# Patient Record
Sex: MALE | Race: OTHER | URBAN - METROPOLITAN AREA
[De-identification: names, ages, dates, MRNs, and addresses within clinical notes are randomized per-mention and may not be internally consistent; named-entity substitution may affect disease eponyms.]

---

## 2018-03-09 ENCOUNTER — TELEPHONE (OUTPATIENT)
Dept: FAMILY MEDICINE CLINIC | Age: 43
End: 2018-03-09

## 2018-03-19 ENCOUNTER — OFFICE VISIT (OUTPATIENT)
Dept: FAMILY MEDICINE CLINIC | Age: 43
End: 2018-03-19

## 2018-03-19 VITALS
HEART RATE: 96 BPM | OXYGEN SATURATION: 98 % | WEIGHT: 192 LBS | HEIGHT: 71 IN | DIASTOLIC BLOOD PRESSURE: 82 MMHG | BODY MASS INDEX: 26.88 KG/M2 | SYSTOLIC BLOOD PRESSURE: 120 MMHG

## 2018-03-19 DIAGNOSIS — Z00.00 WELL ADULT EXAM: Primary | ICD-10-CM

## 2018-03-19 PROCEDURE — 99386 PREV VISIT NEW AGE 40-64: CPT | Performed by: FAMILY MEDICINE

## 2018-03-19 ASSESSMENT — ENCOUNTER SYMPTOMS: RESPIRATORY NEGATIVE: 1

## 2018-03-19 NOTE — PROGRESS NOTES
breath sounds normal. He has no wheezes. He has no rales. Abdominal: Soft. Bowel sounds are normal. He exhibits no distension. There is no splenomegaly or hepatomegaly. There is no tenderness. Musculoskeletal: He exhibits no edema. Neurological: He is alert and oriented to person, place, and time. He has normal reflexes. No cranial nerve deficit. Skin: Skin is warm and dry. He is not diaphoretic. Psychiatric: He has a normal mood and affect. His behavior is normal. Judgment and thought content normal.     Assessment:      1. Well adult exam  Comprehensive Metabolic Panel    Lipid Panel    HIV Screen          Plan:       Will get tdap if no recent found  Fasting bloodwork ordered  Reviewed diet and exercise

## 2018-03-20 DIAGNOSIS — Z00.00 WELL ADULT EXAM: ICD-10-CM

## 2018-03-20 LAB
A/G RATIO: 1.6 (ref 1.1–2.2)
ALBUMIN SERPL-MCNC: 4.6 G/DL (ref 3.4–5)
ALP BLD-CCNC: 71 U/L (ref 40–129)
ALT SERPL-CCNC: 37 U/L (ref 10–40)
ANION GAP SERPL CALCULATED.3IONS-SCNC: 12 MMOL/L (ref 3–16)
AST SERPL-CCNC: 29 U/L (ref 15–37)
BILIRUB SERPL-MCNC: 0.3 MG/DL (ref 0–1)
BUN BLDV-MCNC: 16 MG/DL (ref 7–20)
CALCIUM SERPL-MCNC: 9.4 MG/DL (ref 8.3–10.6)
CHLORIDE BLD-SCNC: 102 MMOL/L (ref 99–110)
CHOLESTEROL, TOTAL: 211 MG/DL (ref 0–199)
CO2: 28 MMOL/L (ref 21–32)
CREAT SERPL-MCNC: 0.9 MG/DL (ref 0.9–1.3)
GFR AFRICAN AMERICAN: >60
GFR NON-AFRICAN AMERICAN: >60
GLOBULIN: 2.9 G/DL
GLUCOSE BLD-MCNC: 122 MG/DL (ref 70–99)
HDLC SERPL-MCNC: 58 MG/DL (ref 40–60)
LDL CHOLESTEROL CALCULATED: 118 MG/DL
POTASSIUM SERPL-SCNC: 4.8 MMOL/L (ref 3.5–5.1)
SODIUM BLD-SCNC: 142 MMOL/L (ref 136–145)
TOTAL PROTEIN: 7.5 G/DL (ref 6.4–8.2)
TRIGL SERPL-MCNC: 175 MG/DL (ref 0–150)
VLDLC SERPL CALC-MCNC: 35 MG/DL

## 2018-03-21 LAB
HIV AG/AB: NORMAL
HIV ANTIGEN: NORMAL
HIV-1 ANTIBODY: NORMAL
HIV-2 AB: NORMAL

## 2018-06-15 ENCOUNTER — TELEPHONE (OUTPATIENT)
Dept: FAMILY MEDICINE CLINIC | Age: 43
End: 2018-06-15

## 2018-12-03 ENCOUNTER — OFFICE VISIT (OUTPATIENT)
Dept: FAMILY MEDICINE CLINIC | Age: 43
End: 2018-12-03
Payer: COMMERCIAL

## 2018-12-03 VITALS
DIASTOLIC BLOOD PRESSURE: 82 MMHG | WEIGHT: 183 LBS | BODY MASS INDEX: 25.62 KG/M2 | HEART RATE: 93 BPM | HEIGHT: 71 IN | SYSTOLIC BLOOD PRESSURE: 138 MMHG | OXYGEN SATURATION: 97 %

## 2018-12-03 DIAGNOSIS — M54.50 ACUTE LOW BACK PAIN WITHOUT SCIATICA, UNSPECIFIED BACK PAIN LATERALITY: ICD-10-CM

## 2018-12-03 DIAGNOSIS — R53.83 FATIGUE, UNSPECIFIED TYPE: ICD-10-CM

## 2018-12-03 DIAGNOSIS — E78.2 ELEVATED TRIGLYCERIDES WITH HIGH CHOLESTEROL: ICD-10-CM

## 2018-12-03 DIAGNOSIS — R73.03 PREDIABETES: Primary | ICD-10-CM

## 2018-12-03 PROCEDURE — 99213 OFFICE O/P EST LOW 20 MIN: CPT | Performed by: FAMILY MEDICINE

## 2018-12-03 RX ORDER — METHYLPREDNISOLONE 4 MG/1
1 TABLET ORAL DAILY
COMMUNITY
Start: 2018-11-30 | End: 2021-02-25

## 2018-12-03 RX ORDER — CYCLOBENZAPRINE HCL 10 MG
10 TABLET ORAL DAILY
COMMUNITY
Start: 2018-11-30 | End: 2021-02-25 | Stop reason: ALTCHOICE

## 2018-12-03 NOTE — PROGRESS NOTES
Subjective:      Patient ID: Cristiana Mims is a 37 y.o. male. HPI   Pt is a of 37 y.o. male comes in today with   Chief Complaint   Patient presents with    Lower Back Pain     Patient complains of lower back pain since Friday, went to [facility] and prescribed     Results     3/20/18     Last Sunday bent forward and pulled back. Friday went to [facility]. Put on flexeril and steroid. Feeling a little better but still some stiffness. In March blood sugar and triglycerides were a little high. blood sugar high in march. Cut carbs in the evening. Has lost a few pounds. More regular exercise    Father has anemia and low thyroid. Notes fatigue at times. Past Medical History:Reviewed  Medications:Reviewed. No Known Allergies   Social hx:Reviewed. History   Smoking Status    Former Smoker    Types: Cigars   Smokeless Tobacco    Never Used     Review of Systems   Constitutional: Negative. Cardiovascular: Negative. Genitourinary: Negative. Vitals:    12/03/18 1404   BP: 138/82   Site: Right Upper Arm   Position: Sitting   Cuff Size: Medium Adult   Pulse: 93   SpO2: 97%   Weight: 183 lb (83 kg)   Height: 5' 11.25\" (1.81 m)      Objective:   Physical Exam    Assessment:       Diagnosis Orders   1. Prediabetes  Hemoglobin A1C   2. Elevated triglycerides with high cholesterol  Comprehensive Metabolic Panel    Lipid Panel   3. Fatigue, unspecified type  Comprehensive Metabolic Panel    CBC    TSH with Reflex    VITAMIN B12 & FOLATE   4. Acute low back pain without sciatica, unspecified back pain laterality            Plan:      Simeon Sahu was seen today for lower back pain and results. Diagnoses and all orders for this visit:    Prediabetes  -     Hemoglobin A1C; Future  Recheck to see if stable with diet and exercise  Elevated triglycerides with high cholesterol  -     Comprehensive Metabolic Panel; Future  -     Lipid Panel;  Future  Trying to control with therapeutic lifestyle changes   Fatigue, unspecified type  -     Comprehensive Metabolic Panel; Future  -     CBC; Future  -     TSH with Reflex; Future  -     VITAMIN B12 & FOLATE; Future  bloodwork to evaluate  Acute low back pain without sciatica, unspecified back pain laterality  Improving.  Home physical therapy exercises given and reviewed           Susan Parham MD

## 2021-02-25 ENCOUNTER — OFFICE VISIT (OUTPATIENT)
Dept: FAMILY MEDICINE CLINIC | Age: 46
End: 2021-02-25
Payer: COMMERCIAL

## 2021-02-25 VITALS
HEIGHT: 71 IN | DIASTOLIC BLOOD PRESSURE: 72 MMHG | SYSTOLIC BLOOD PRESSURE: 136 MMHG | WEIGHT: 180 LBS | OXYGEN SATURATION: 98 % | BODY MASS INDEX: 25.2 KG/M2 | HEART RATE: 93 BPM

## 2021-02-25 DIAGNOSIS — Z12.11 COLON CANCER SCREENING: ICD-10-CM

## 2021-02-25 DIAGNOSIS — R53.83 FATIGUE, UNSPECIFIED TYPE: ICD-10-CM

## 2021-02-25 DIAGNOSIS — Z00.00 WELL ADULT EXAM: Primary | ICD-10-CM

## 2021-02-25 DIAGNOSIS — Z00.00 WELL ADULT EXAM: ICD-10-CM

## 2021-02-25 LAB
A/G RATIO: 1.6 (ref 1.1–2.2)
ALBUMIN SERPL-MCNC: 4.6 G/DL (ref 3.4–5)
ALP BLD-CCNC: 70 U/L (ref 40–129)
ALT SERPL-CCNC: 25 U/L (ref 10–40)
ANION GAP SERPL CALCULATED.3IONS-SCNC: 13 MMOL/L (ref 3–16)
AST SERPL-CCNC: 21 U/L (ref 15–37)
BILIRUB SERPL-MCNC: 0.5 MG/DL (ref 0–1)
BUN BLDV-MCNC: 14 MG/DL (ref 7–20)
CALCIUM SERPL-MCNC: 10 MG/DL (ref 8.3–10.6)
CHLORIDE BLD-SCNC: 101 MMOL/L (ref 99–110)
CHOLESTEROL, TOTAL: 218 MG/DL (ref 0–199)
CO2: 25 MMOL/L (ref 21–32)
CREAT SERPL-MCNC: 0.9 MG/DL (ref 0.9–1.3)
GFR AFRICAN AMERICAN: >60
GFR NON-AFRICAN AMERICAN: >60
GLOBULIN: 2.8 G/DL
GLUCOSE BLD-MCNC: 123 MG/DL (ref 70–99)
HCT VFR BLD CALC: 41.2 % (ref 40.5–52.5)
HDLC SERPL-MCNC: 60 MG/DL (ref 40–60)
HEMOGLOBIN: 13.6 G/DL (ref 13.5–17.5)
LDL CHOLESTEROL CALCULATED: 132 MG/DL
MCH RBC QN AUTO: 26.4 PG (ref 26–34)
MCHC RBC AUTO-ENTMCNC: 32.9 G/DL (ref 31–36)
MCV RBC AUTO: 80.1 FL (ref 80–100)
PDW BLD-RTO: 13.2 % (ref 12.4–15.4)
PLATELET # BLD: 191 K/UL (ref 135–450)
PMV BLD AUTO: 10.1 FL (ref 5–10.5)
POTASSIUM SERPL-SCNC: 4.6 MMOL/L (ref 3.5–5.1)
RBC # BLD: 5.14 M/UL (ref 4.2–5.9)
SODIUM BLD-SCNC: 139 MMOL/L (ref 136–145)
TOTAL PROTEIN: 7.4 G/DL (ref 6.4–8.2)
TRIGL SERPL-MCNC: 132 MG/DL (ref 0–150)
TSH REFLEX: 2.76 UIU/ML (ref 0.27–4.2)
VLDLC SERPL CALC-MCNC: 26 MG/DL
WBC # BLD: 7.4 K/UL (ref 4–11)

## 2021-02-25 PROCEDURE — 99396 PREV VISIT EST AGE 40-64: CPT | Performed by: FAMILY MEDICINE

## 2021-02-25 RX ORDER — SILDENAFIL 100 MG/1
100 TABLET, FILM COATED ORAL PRN
Qty: 30 TABLET | Refills: 1 | Status: SHIPPED | OUTPATIENT
Start: 2021-02-25 | End: 2022-07-12

## 2021-02-25 SDOH — ECONOMIC STABILITY: FOOD INSECURITY: WITHIN THE PAST 12 MONTHS, THE FOOD YOU BOUGHT JUST DIDN'T LAST AND YOU DIDN'T HAVE MONEY TO GET MORE.: NEVER TRUE

## 2021-02-25 SDOH — ECONOMIC STABILITY: INCOME INSECURITY: HOW HARD IS IT FOR YOU TO PAY FOR THE VERY BASICS LIKE FOOD, HOUSING, MEDICAL CARE, AND HEATING?: NOT HARD AT ALL

## 2021-02-25 SDOH — ECONOMIC STABILITY: TRANSPORTATION INSECURITY
IN THE PAST 12 MONTHS, HAS LACK OF TRANSPORTATION KEPT YOU FROM MEETINGS, WORK, OR FROM GETTING THINGS NEEDED FOR DAILY LIVING?: NO

## 2021-02-25 ASSESSMENT — PATIENT HEALTH QUESTIONNAIRE - PHQ9: SUM OF ALL RESPONSES TO PHQ QUESTIONS 1-9: 2

## 2021-02-25 NOTE — PROGRESS NOTES
2021    Oswald Hawk (:  1975) is a 50 y.o. male, here for a preventive medicine evaluation. Patient Active Problem List   Diagnosis    Headache     More sitting since working from home. More fatigue. Worse after meals. Frequent awakenings. Worse recently. Falls asleep quickly. Review of Systems    Prior to Visit Medications    Not on File        No Known Allergies    Past Medical History:   Diagnosis Date    Headache 2014       No past surgical history on file.     Social History     Socioeconomic History    Marital status:      Spouse name: Not on file    Number of children: Not on file    Years of education: Not on file    Highest education level: Not on file   Occupational History    Not on file   Social Needs    Financial resource strain: Not hard at all    Food insecurity     Worry: Never true     Inability: Never true   [employer] needs     Medical: No     Non-medical: No   Tobacco Use    Smoking status: Former Smoker     Types: Cigars    Smokeless tobacco: Never Used   Substance and Sexual Activity    Alcohol use: No    Drug use: No    Sexual activity: Yes   Lifestyle    Physical activity     Days per week: Not on file     Minutes per session: Not on file    Stress: Not on file   Relationships    Social connections     Talks on phone: Not on file     Gets together: Not on file     Attends Jehovah's witness service: Not on file     Active member of club or organization: Not on file     Attends meetings of clubs or organizations: Not on file     Relationship status: Not on file    Intimate partner violence     Fear of current or ex partner: Not on file     Emotionally abused: Not on file     Physically abused: Not on file     Forced sexual activity: Not on file   Other Topics Concern    Not on file   Social History Narrative    Not on file        Family History   Problem Relation Age of Onset    Diabetes Mother        ADVANCE DIRECTIVE: N, <no following reasons:    Unable to determine if patient is Non-     Immunization History   Administered Date(s) Administered    Influenza Virus Vaccine 12/21/2007    Td, unspecified formulation 06/01/2005, 12/01/2005    Tdap (Boostrix, Adacel) 05/27/2015       Health Maintenance   Topic Date Due    Hepatitis C screen  1975    Flu vaccine (1) 09/01/2020    Lipid screen  03/20/2023    DTaP/Tdap/Td vaccine (2 - Td) 05/27/2025    HIV screen  Completed    Hepatitis A vaccine  Aged Out    Hepatitis B vaccine  Aged Out    Hib vaccine  Aged Out    Meningococcal (ACWY) vaccine  Aged Out    Pneumococcal 0-64 years Vaccine  Aged Out       ASSESSMENT/PLAN:  1. Well adult exam  -     Comprehensive Metabolic Panel; Future  -     Lipid Panel; Future  -     CBC; Future  -     TSH with Reflex; Future  -     Testosterone, free, total; Future  -     Cologuard; Future  Reviewed diet and exercise  2. Fatigue, unspecified type  -     CBC; Future  -     TSH with Reflex; Future  -     Testosterone, free, total; Future  bloodwork to evaluate  3. Colon cancer screening  -     Cologuard; Future  -     Cologuard (For External Results Only); Future      No follow-ups on file. An electronic signature was used to authenticate this note.     --Sarah Granados MD on 2/25/2021 at 9:16 AM

## 2021-03-02 LAB
SEX HORMONE BINDING GLOBULIN: 11 NMOL/L (ref 11–80)
TESTOSTERONE FREE-NONMALE: 69 PG/ML (ref 47–244)
TESTOSTERONE TOTAL: 223 NG/DL (ref 220–1000)

## 2021-04-14 ENCOUNTER — TELEPHONE (OUTPATIENT)
Dept: FAMILY MEDICINE CLINIC | Age: 46
End: 2021-04-14

## 2021-12-29 ENCOUNTER — TELEPHONE (OUTPATIENT)
Dept: FAMILY MEDICINE CLINIC | Age: 46
End: 2021-12-29

## 2021-12-29 DIAGNOSIS — Z11.52 ENCOUNTER FOR SCREENING FOR COVID-19: Primary | ICD-10-CM

## 2021-12-29 NOTE — TELEPHONE ENCOUNTER
Pt needs a covid order to put in for [place] to travel [place]. Pt would like to get it completed at [place] [address]. Pt would like to get it completed on Friday.      Phone # [phone]     Fax # [phone]     Pt phone # [phone]

## 2021-12-30 NOTE — TELEPHONE ENCOUNTER
Pt would like the covid order to be emailed over to him @ [email or web address]     Pt would also like it faxed to [facility]

## 2022-07-12 RX ORDER — SILDENAFIL 100 MG/1
TABLET, FILM COATED ORAL
Qty: 27 TABLET | Refills: 0 | Status: SHIPPED | OUTPATIENT
Start: 2022-07-12

## 2022-07-12 NOTE — TELEPHONE ENCOUNTER
Medication:   Requested Prescriptions     Pending Prescriptions Disp Refills    sildenafil (VIAGRA) 100 MG tablet [Pharmacy Med Name: SILDENAFIL 100MG TABLETS] 27 tablet      Sig: TAKE 1 TABLET BY MOUTH EVERY DAY AS NEEDED FOR ERECTILE DYSFUNCTION.         Last Filled:  2/25/2021  Last appt: 2/25/2021   Next appt: none

## 2022-11-14 ENCOUNTER — OFFICE VISIT (OUTPATIENT)
Dept: FAMILY MEDICINE CLINIC | Age: 47
End: 2022-11-14
Payer: COMMERCIAL

## 2022-11-14 VITALS
BODY MASS INDEX: 25.9 KG/M2 | OXYGEN SATURATION: 98 % | HEART RATE: 83 BPM | HEIGHT: 71 IN | SYSTOLIC BLOOD PRESSURE: 122 MMHG | WEIGHT: 185 LBS | DIASTOLIC BLOOD PRESSURE: 74 MMHG

## 2022-11-14 DIAGNOSIS — Z00.00 WELL ADULT EXAM: Primary | ICD-10-CM

## 2022-11-14 PROCEDURE — 99396 PREV VISIT EST AGE 40-64: CPT | Performed by: FAMILY MEDICINE

## 2022-11-14 SDOH — ECONOMIC STABILITY: FOOD INSECURITY: WITHIN THE PAST 12 MONTHS, THE FOOD YOU BOUGHT JUST DIDN'T LAST AND YOU DIDN'T HAVE MONEY TO GET MORE.: NEVER TRUE

## 2022-11-14 SDOH — ECONOMIC STABILITY: FOOD INSECURITY: WITHIN THE PAST 12 MONTHS, YOU WORRIED THAT YOUR FOOD WOULD RUN OUT BEFORE YOU GOT MONEY TO BUY MORE.: NEVER TRUE

## 2022-11-14 ASSESSMENT — PATIENT HEALTH QUESTIONNAIRE - PHQ9
SUM OF ALL RESPONSES TO PHQ QUESTIONS 1-9: 0
2. FEELING DOWN, DEPRESSED OR HOPELESS: 0
1. LITTLE INTEREST OR PLEASURE IN DOING THINGS: 0
SUM OF ALL RESPONSES TO PHQ QUESTIONS 1-9: 0
SUM OF ALL RESPONSES TO PHQ9 QUESTIONS 1 & 2: 0

## 2022-11-14 ASSESSMENT — ENCOUNTER SYMPTOMS: RESPIRATORY NEGATIVE: 1

## 2022-11-14 ASSESSMENT — SOCIAL DETERMINANTS OF HEALTH (SDOH): HOW HARD IS IT FOR YOU TO PAY FOR THE VERY BASICS LIKE FOOD, HOUSING, MEDICAL CARE, AND HEATING?: NOT HARD AT ALL

## 2022-11-14 NOTE — PROGRESS NOTES
Dolores Morgan (:  1975) is a 50 y.o. male,Established patient, here for evaluation of the following chief complaint(s): Annual Exam         ASSESSMENT/PLAN:  Vitaly Morton was seen today for annual exam.    Diagnoses and all orders for this visit:    Well adult exam  -     Lipid Panel; Future  -     Comprehensive Metabolic Panel; Future  Good diet and exercise     No follow-ups on file. Subjective   SUBJECTIVE/OBJECTIVE:  HPI  Pt is a of 50 y.o. male comes in today with   Chief Complaint   Patient presents with    Annual Exam     Increased stress and not as much exercise  Did not get cologuard done   Plans on doing that. Past Medical History:Reviewed  Medications:Reviewed. No Known Allergies   Social hx:Reviewed. Social History     Tobacco Use   Smoking Status Former    Types: Cigars   Smokeless Tobacco Never     Vitals:    22 1355   BP: 122/74   Site: Right Upper Arm   Position: Sitting   Cuff Size: Small Adult   Pulse: 83   SpO2: 98%   Weight: 185 lb (83.9 kg)   Height: 5' 11.25\" (1.81 m)      Review of Systems   Constitutional: Negative. Respiratory: Negative. Cardiovascular: Negative. Psychiatric/Behavioral: Negative. Objective   Physical Exam  Constitutional:       Appearance: Normal appearance. He is well-developed and normal weight. HENT:      Head: Normocephalic and atraumatic. Right Ear: Tympanic membrane, ear canal and external ear normal.      Left Ear: Tympanic membrane, ear canal and external ear normal.      Mouth/Throat:      Pharynx: Oropharynx is clear. Eyes:      General: No scleral icterus. Conjunctiva/sclera: Conjunctivae normal.   Neck:      Thyroid: No thyromegaly. Cardiovascular:      Rate and Rhythm: Normal rate and regular rhythm. Heart sounds: Normal heart sounds. No murmur heard. Pulmonary:      Effort: Pulmonary effort is normal.      Breath sounds: Normal breath sounds. No wheezing or rales.    Abdominal:      General: Bowel sounds are normal. There is no distension. Palpations: Abdomen is soft. There is no hepatomegaly or splenomegaly. Tenderness: There is no abdominal tenderness. Musculoskeletal:      Cervical back: Normal range of motion and neck supple. Skin:     General: Skin is warm and dry. Neurological:      Mental Status: He is alert and oriented to person, place, and time. Cranial Nerves: No cranial nerve deficit. Deep Tendon Reflexes: Reflexes are normal and symmetric. Psychiatric:         Behavior: Behavior normal.         Thought Content: Thought content normal.         Judgment: Judgment normal.            An electronic signature was used to authenticate this note.     --Danielle De La Vega MD

## 2022-11-15 DIAGNOSIS — Z00.00 WELL ADULT EXAM: ICD-10-CM

## 2022-11-15 LAB
A/G RATIO: 1.9 (ref 1.1–2.2)
ALBUMIN SERPL-MCNC: 4.9 G/DL (ref 3.4–5)
ALP BLD-CCNC: 71 U/L (ref 40–129)
ALT SERPL-CCNC: 25 U/L (ref 10–40)
ANION GAP SERPL CALCULATED.3IONS-SCNC: 15 MMOL/L (ref 3–16)
AST SERPL-CCNC: 23 U/L (ref 15–37)
BILIRUB SERPL-MCNC: 0.4 MG/DL (ref 0–1)
BUN BLDV-MCNC: 13 MG/DL (ref 7–20)
CALCIUM SERPL-MCNC: 10.1 MG/DL (ref 8.3–10.6)
CHLORIDE BLD-SCNC: 102 MMOL/L (ref 99–110)
CHOLESTEROL, TOTAL: 218 MG/DL (ref 0–199)
CO2: 24 MMOL/L (ref 21–32)
CREAT SERPL-MCNC: 0.9 MG/DL (ref 0.9–1.3)
GFR SERPL CREATININE-BSD FRML MDRD: >60 ML/MIN/{1.73_M2}
GLUCOSE BLD-MCNC: 142 MG/DL (ref 70–99)
HDLC SERPL-MCNC: 62 MG/DL (ref 40–60)
LDL CHOLESTEROL CALCULATED: 126 MG/DL
POTASSIUM SERPL-SCNC: 4.5 MMOL/L (ref 3.5–5.1)
SODIUM BLD-SCNC: 141 MMOL/L (ref 136–145)
TOTAL PROTEIN: 7.5 G/DL (ref 6.4–8.2)
TRIGL SERPL-MCNC: 152 MG/DL (ref 0–150)
VLDLC SERPL CALC-MCNC: 30 MG/DL

## 2022-12-27 ENCOUNTER — TELEPHONE (OUTPATIENT)
Dept: FAMILY MEDICINE CLINIC | Age: 47
End: 2022-12-27

## 2022-12-27 DIAGNOSIS — Z12.11 COLON CANCER SCREENING: Primary | ICD-10-CM

## 2022-12-27 NOTE — TELEPHONE ENCOUNTER
----- Message from Ranulfo sent at 2022 11:39 AM EST -----  Subject: Referral Request    Reason for referral request? Patient is requesting another kit from [facility] he previous one . Provider patient wants to be referred to(if known):     Provider Phone Number(if known):     Additional Information for Provider?   ---------------------------------------------------------------------------  --------------  [address]    [phone]; OK to leave message on voicemail  ---------------------------------------------------------------------------  --------------

## 2022-12-28 NOTE — TELEPHONE ENCOUNTER
I called the patient to let him know this information. He stated that he called [facility] and they said since the order , they will need another order sent to them so they can give the patient another kit.

## 2023-05-09 ENCOUNTER — TELEPHONE (OUTPATIENT)
Dept: PRIMARY CARE CLINIC | Age: 48
End: 2023-05-09

## 2023-08-14 ENCOUNTER — TELEPHONE (OUTPATIENT)
Dept: PRIMARY CARE CLINIC | Age: 48
End: 2023-08-14

## 2024-03-12 ENCOUNTER — OFFICE VISIT (OUTPATIENT)
Dept: FAMILY MEDICINE CLINIC | Age: 49
End: 2024-03-12
Payer: COMMERCIAL

## 2024-03-12 VITALS
SYSTOLIC BLOOD PRESSURE: 124 MMHG | DIASTOLIC BLOOD PRESSURE: 68 MMHG | OXYGEN SATURATION: 97 % | WEIGHT: 184 LBS | BODY MASS INDEX: 25.76 KG/M2 | HEART RATE: 79 BPM | HEIGHT: 71 IN

## 2024-03-12 DIAGNOSIS — R73.03 PREDIABETES: ICD-10-CM

## 2024-03-12 DIAGNOSIS — Z12.11 COLON CANCER SCREENING: ICD-10-CM

## 2024-03-12 DIAGNOSIS — Z00.00 WELL ADULT EXAM: Primary | ICD-10-CM

## 2024-03-12 PROCEDURE — 99396 PREV VISIT EST AGE 40-64: CPT | Performed by: FAMILY MEDICINE

## 2024-03-12 RX ORDER — TYPHOID VACC,LIVE,ATTENUATED 2B UNIT
1 CAPSULE,DELAYED RELEASE (ENTERIC COATED) ORAL EVERY OTHER DAY
Qty: 4 CAPSULE | Refills: 0 | Status: SHIPPED | OUTPATIENT
Start: 2024-03-12

## 2024-03-12 RX ORDER — MECLIZINE HYDROCHLORIDE 25 MG/1
25 TABLET ORAL 3 TIMES DAILY PRN
Qty: 30 TABLET | Refills: 0 | Status: SHIPPED | OUTPATIENT
Start: 2024-03-12 | End: 2024-03-22

## 2024-03-12 RX ORDER — ACETAZOLAMIDE 125 MG/1
125 TABLET ORAL 2 TIMES DAILY
Qty: 20 TABLET | Refills: 0 | Status: SHIPPED | OUTPATIENT
Start: 2024-03-12 | End: 2024-03-22

## 2024-03-12 SDOH — ECONOMIC STABILITY: HOUSING INSECURITY
IN THE LAST 12 MONTHS, WAS THERE A TIME WHEN YOU DID NOT HAVE A STEADY PLACE TO SLEEP OR SLEPT IN A SHELTER (INCLUDING NOW)?: NO

## 2024-03-12 SDOH — ECONOMIC STABILITY: INCOME INSECURITY: HOW HARD IS IT FOR YOU TO PAY FOR THE VERY BASICS LIKE FOOD, HOUSING, MEDICAL CARE, AND HEATING?: NOT HARD AT ALL

## 2024-03-12 SDOH — ECONOMIC STABILITY: FOOD INSECURITY: WITHIN THE PAST 12 MONTHS, THE FOOD YOU BOUGHT JUST DIDN'T LAST AND YOU DIDN'T HAVE MONEY TO GET MORE.: NEVER TRUE

## 2024-03-12 SDOH — ECONOMIC STABILITY: FOOD INSECURITY: WITHIN THE PAST 12 MONTHS, YOU WORRIED THAT YOUR FOOD WOULD RUN OUT BEFORE YOU GOT MONEY TO BUY MORE.: NEVER TRUE

## 2024-03-12 ASSESSMENT — PATIENT HEALTH QUESTIONNAIRE - PHQ9
SUM OF ALL RESPONSES TO PHQ QUESTIONS 1-9: 0
SUM OF ALL RESPONSES TO PHQ QUESTIONS 1-9: 0
2. FEELING DOWN, DEPRESSED OR HOPELESS: 0
1. LITTLE INTEREST OR PLEASURE IN DOING THINGS: 0
SUM OF ALL RESPONSES TO PHQ QUESTIONS 1-9: 0
SUM OF ALL RESPONSES TO PHQ9 QUESTIONS 1 & 2: 0
SUM OF ALL RESPONSES TO PHQ QUESTIONS 1-9: 0

## 2024-03-12 ASSESSMENT — ENCOUNTER SYMPTOMS: RESPIRATORY NEGATIVE: 1

## 2024-03-12 NOTE — PROGRESS NOTES
membrane, ear canal and external ear normal.      Left Ear: Tympanic membrane, ear canal and external ear normal.      Mouth/Throat:      Pharynx: Oropharynx is clear.   Eyes:      General: No scleral icterus.     Conjunctiva/sclera: Conjunctivae normal.   Neck:      Thyroid: No thyromegaly.   Cardiovascular:      Rate and Rhythm: Normal rate and regular rhythm.      Heart sounds: Normal heart sounds. No murmur heard.  Pulmonary:      Effort: Pulmonary effort is normal.      Breath sounds: Normal breath sounds. No wheezing or rales.   Abdominal:      General: Bowel sounds are normal. There is no distension.      Palpations: Abdomen is soft. There is no hepatomegaly or splenomegaly.      Tenderness: There is no abdominal tenderness.   Musculoskeletal:      Cervical back: Normal range of motion and neck supple.   Skin:     General: Skin is warm and dry.   Neurological:      Mental Status: He is alert and oriented to person, place, and time.      Cranial Nerves: No cranial nerve deficit.      Deep Tendon Reflexes: Reflexes are normal and symmetric.   Psychiatric:         Behavior: Behavior normal.         Thought Content: Thought content normal.         Judgment: Judgment normal.              An electronic signature was used to authenticate this note.    --Ed Leblanc MD

## 2024-03-13 DIAGNOSIS — R73.03 PREDIABETES: ICD-10-CM

## 2024-03-13 DIAGNOSIS — Z00.00 WELL ADULT EXAM: ICD-10-CM

## 2024-03-13 LAB
ALBUMIN SERPL-MCNC: 4.5 G/DL (ref 3.4–5)
ALBUMIN/GLOB SERPL: 1.6 {RATIO} (ref 1.1–2.2)
ALP SERPL-CCNC: 74 U/L (ref 40–129)
ALT SERPL-CCNC: 21 U/L (ref 10–40)
ANION GAP SERPL CALCULATED.3IONS-SCNC: 13 MMOL/L (ref 3–16)
AST SERPL-CCNC: 22 U/L (ref 15–37)
BILIRUB SERPL-MCNC: <0.2 MG/DL (ref 0–1)
BUN SERPL-MCNC: 14 MG/DL (ref 7–20)
CALCIUM SERPL-MCNC: 9.8 MG/DL (ref 8.3–10.6)
CHLORIDE SERPL-SCNC: 102 MMOL/L (ref 99–110)
CHOLEST SERPL-MCNC: 223 MG/DL (ref 0–199)
CO2 SERPL-SCNC: 25 MMOL/L (ref 21–32)
CREAT SERPL-MCNC: 1 MG/DL (ref 0.9–1.3)
GFR SERPLBLD CREATININE-BSD FMLA CKD-EPI: >60 ML/MIN/{1.73_M2}
GLUCOSE SERPL-MCNC: 130 MG/DL (ref 70–99)
HBV SURFACE AB SERPL IA-ACNC: <3.5 MIU/ML
HDLC SERPL-MCNC: 55 MG/DL (ref 40–60)
LDLC SERPL CALC-MCNC: 146 MG/DL
POTASSIUM SERPL-SCNC: 4.5 MMOL/L (ref 3.5–5.1)
PROT SERPL-MCNC: 7.3 G/DL (ref 6.4–8.2)
SODIUM SERPL-SCNC: 140 MMOL/L (ref 136–145)
TRIGL SERPL-MCNC: 112 MG/DL (ref 0–150)
VLDLC SERPL CALC-MCNC: 22 MG/DL

## 2024-03-14 LAB
EST. AVERAGE GLUCOSE BLD GHB EST-MCNC: 134.1 MG/DL
HBA1C MFR BLD: 6.3 %